# Patient Record
Sex: FEMALE | Race: WHITE | NOT HISPANIC OR LATINO | Employment: FULL TIME | URBAN - METROPOLITAN AREA
[De-identification: names, ages, dates, MRNs, and addresses within clinical notes are randomized per-mention and may not be internally consistent; named-entity substitution may affect disease eponyms.]

---

## 2018-07-30 ENCOUNTER — HOSPITAL ENCOUNTER (EMERGENCY)
Facility: HOSPITAL | Age: 18
Discharge: HOME | End: 2018-07-30
Attending: FAMILY MEDICINE
Payer: MEDICAID

## 2018-07-30 VITALS
HEIGHT: 64 IN | BODY MASS INDEX: 27.31 KG/M2 | WEIGHT: 160 LBS | OXYGEN SATURATION: 99 % | RESPIRATION RATE: 20 BRPM | TEMPERATURE: 98 F | SYSTOLIC BLOOD PRESSURE: 127 MMHG | HEART RATE: 99 BPM | DIASTOLIC BLOOD PRESSURE: 91 MMHG

## 2018-07-30 DIAGNOSIS — M54.40 BACK PAIN OF LUMBAR REGION WITH SCIATICA: Primary | ICD-10-CM

## 2018-07-30 PROCEDURE — 99283 EMERGENCY DEPT VISIT LOW MDM: CPT

## 2018-07-30 PROCEDURE — 25000003 PHARM REV CODE 250: Performed by: NURSE PRACTITIONER

## 2018-07-30 RX ORDER — CYCLOBENZAPRINE HCL 5 MG
10 TABLET ORAL
Status: COMPLETED | OUTPATIENT
Start: 2018-07-30 | End: 2018-07-30

## 2018-07-30 RX ORDER — CYCLOBENZAPRINE HCL 10 MG
5 TABLET ORAL 3 TIMES DAILY PRN
Qty: 15 TABLET | Refills: 0 | Status: SHIPPED | OUTPATIENT
Start: 2018-07-30 | End: 2018-08-04

## 2018-07-30 RX ORDER — NAPROXEN 500 MG/1
500 TABLET ORAL 2 TIMES DAILY PRN
Qty: 30 TABLET | Refills: 0 | Status: SHIPPED | OUTPATIENT
Start: 2018-07-30 | End: 2018-08-09

## 2018-07-30 RX ORDER — KETOROLAC TROMETHAMINE 10 MG/1
10 TABLET, FILM COATED ORAL
Status: COMPLETED | OUTPATIENT
Start: 2018-07-30 | End: 2018-07-30

## 2018-07-30 RX ADMIN — KETOROLAC TROMETHAMINE 10 MG: 10 TABLET, FILM COATED ORAL at 11:07

## 2018-07-30 RX ADMIN — CYCLOBENZAPRINE HYDROCHLORIDE 10 MG: 5 TABLET, FILM COATED ORAL at 11:07

## 2018-07-30 NOTE — ED PROVIDER NOTES
Encounter Date: 7/30/2018       History     Chief Complaint   Patient presents with    left leg pain, lower back     Patient with chronic low back pain.  Denies recent injury  Increase pain x 1 week  Pain radiating down left leg  Pain 10/10. Patient made no attempt to take OTC medications.      The history is provided by the patient.   Back Pain    This is a recurrent problem. The current episode started several days ago. The problem occurs constantly. The problem has been gradually worsening. The pain is associated with no known injury. The pain is present in the lumbar spine. The quality of the pain is described as shooting. The pain radiates to the left leg and left thigh. The pain is at a severity of 10/10. The symptoms are aggravated by bending, twisting and certain positions. Pertinent negatives include no chest pain, no fever, no numbness, no weight loss, no headaches, no abdominal pain, no abdominal swelling, no bowel incontinence, no perianal numbness, no bladder incontinence, no dysuria, no pelvic pain, no leg pain, no paresthesias, no paresis, no tingling and no weakness. She has tried nothing for the symptoms. The treatment provided no relief.     Review of patient's allergies indicates:  No Known Allergies  History reviewed. No pertinent past medical history.  History reviewed. No pertinent surgical history.  History reviewed. No pertinent family history.  Social History   Substance Use Topics    Smoking status: Current Some Day Smoker    Smokeless tobacco: Not on file    Alcohol use Yes      Comment: occ     Review of Systems   Constitutional: Negative for fever and weight loss.   HENT: Negative for sore throat.    Respiratory: Negative for shortness of breath.    Cardiovascular: Negative for chest pain.   Gastrointestinal: Negative for abdominal pain, bowel incontinence and nausea.   Genitourinary: Negative for bladder incontinence, dysuria and pelvic pain.   Musculoskeletal: Positive for back  pain.   Skin: Negative for rash.   Neurological: Negative for tingling, weakness, numbness, headaches and paresthesias.   Hematological: Does not bruise/bleed easily.   All other systems reviewed and are negative.      Physical Exam     Initial Vitals [07/30/18 1040]   BP Pulse Resp Temp SpO2   (!) 127/91 99 20 98.3 °F (36.8 °C) 99 %      MAP       --         Physical Exam    Nursing note and vitals reviewed.  Constitutional: She appears well-developed and well-nourished.   HENT:   Head: Normocephalic.   Eyes: Pupils are equal, round, and reactive to light.   Neck: Normal range of motion.   Cardiovascular: Normal rate, regular rhythm and normal heart sounds.   Pulmonary/Chest: Breath sounds normal.   Musculoskeletal: Normal range of motion. She exhibits tenderness.        Lumbar back: She exhibits tenderness. She exhibits no bony tenderness.        Back:    Neurological: She is alert and oriented to person, place, and time.   Skin: Skin is warm and dry.   Psychiatric: She has a normal mood and affect. Her behavior is normal. Judgment and thought content normal.         ED Course   Procedures  Labs Reviewed - No data to display       Imaging Results    None          Medical Decision Making:   Initial Assessment:   Chronic low back pain with sciatic  ED Management:  Meds admin.    Discussed physical exam findings with patient  No acute emergent medication condition identified at this time to warrant further testing/diagnostics.  Plan to discharge patient home with instructions to follow-up with PCP in 2-5 days or return to ED if symptoms  Worsen.  Patient verbalized agreement to discharge treatment plan.                        Clinical Impression:   The encounter diagnosis was Back pain of lumbar region with sciatica.                             Mariana Denis NP  07/30/18 5354

## 2021-02-23 PROCEDURE — 99284 EMERGENCY DEPT VISIT MOD MDM: CPT

## 2021-02-24 ENCOUNTER — HOSPITAL ENCOUNTER (EMERGENCY)
Facility: HOSPITAL | Age: 21
Discharge: HOME | End: 2021-02-24
Attending: EMERGENCY MEDICINE
Payer: MEDICAID

## 2021-02-24 VITALS
RESPIRATION RATE: 17 BRPM | BODY MASS INDEX: 40.63 KG/M2 | OXYGEN SATURATION: 100 % | TEMPERATURE: 98 F | SYSTOLIC BLOOD PRESSURE: 119 MMHG | WEIGHT: 238 LBS | HEART RATE: 84 BPM | DIASTOLIC BLOOD PRESSURE: 74 MMHG | HEIGHT: 64 IN

## 2021-02-24 DIAGNOSIS — I10 HYPERTENSION, UNSPECIFIED TYPE: Primary | ICD-10-CM

## 2021-02-24 DIAGNOSIS — N39.0 URINARY TRACT INFECTION WITHOUT HEMATURIA, SITE UNSPECIFIED: ICD-10-CM

## 2021-02-24 LAB
ALBUMIN SERPL BCP-MCNC: 2.9 G/DL (ref 3.5–5.2)
ALP SERPL-CCNC: 72 U/L (ref 55–135)
ALT SERPL W/O P-5'-P-CCNC: 18 U/L (ref 10–44)
ANION GAP SERPL CALC-SCNC: 13 MMOL/L (ref 8–16)
AST SERPL-CCNC: 14 U/L (ref 10–40)
BACTERIA #/AREA URNS HPF: ABNORMAL /HPF
BASOPHILS # BLD AUTO: 0.05 K/UL (ref 0–0.2)
BASOPHILS NFR BLD: 0.3 % (ref 0–1.9)
BILIRUB SERPL-MCNC: 0.6 MG/DL (ref 0.1–1)
BILIRUB UR QL STRIP: NEGATIVE
BUN SERPL-MCNC: 18 MG/DL (ref 6–20)
CALCIUM SERPL-MCNC: 8.4 MG/DL (ref 8.7–10.5)
CHLORIDE SERPL-SCNC: 100 MMOL/L (ref 95–110)
CLARITY UR: CLEAR
CO2 SERPL-SCNC: 24 MMOL/L (ref 23–29)
COLOR UR: YELLOW
CREAT SERPL-MCNC: 0.9 MG/DL (ref 0.5–1.4)
DIFFERENTIAL METHOD: ABNORMAL
EOSINOPHIL # BLD AUTO: 0.2 K/UL (ref 0–0.5)
EOSINOPHIL NFR BLD: 1.4 % (ref 0–8)
ERYTHROCYTE [DISTWIDTH] IN BLOOD BY AUTOMATED COUNT: 16.5 % (ref 11.5–14.5)
EST. GFR  (AFRICAN AMERICAN): >60 ML/MIN/1.73 M^2
EST. GFR  (NON AFRICAN AMERICAN): >60 ML/MIN/1.73 M^2
GLUCOSE SERPL-MCNC: 99 MG/DL (ref 70–110)
GLUCOSE UR QL STRIP: NEGATIVE
HCT VFR BLD AUTO: 34.3 % (ref 37–48.5)
HGB BLD-MCNC: 10.6 G/DL (ref 12–16)
HGB UR QL STRIP: ABNORMAL
HYALINE CASTS #/AREA URNS LPF: 12 /LPF
IMM GRANULOCYTES # BLD AUTO: 0.32 K/UL (ref 0–0.04)
IMM GRANULOCYTES NFR BLD AUTO: 2.1 % (ref 0–0.5)
KETONES UR QL STRIP: NEGATIVE
LEUKOCYTE ESTERASE UR QL STRIP: ABNORMAL
LYMPHOCYTES # BLD AUTO: 2.9 K/UL (ref 1–4.8)
LYMPHOCYTES NFR BLD: 18.6 % (ref 18–48)
MCH RBC QN AUTO: 27.7 PG (ref 27–31)
MCHC RBC AUTO-ENTMCNC: 30.9 G/DL (ref 32–36)
MCV RBC AUTO: 90 FL (ref 82–98)
MICROSCOPIC COMMENT: ABNORMAL
MONOCYTES # BLD AUTO: 0.8 K/UL (ref 0.3–1)
MONOCYTES NFR BLD: 5.3 % (ref 4–15)
NEUTROPHILS # BLD AUTO: 11.1 K/UL (ref 1.8–7.7)
NEUTROPHILS NFR BLD: 72.3 % (ref 38–73)
NITRITE UR QL STRIP: NEGATIVE
NRBC BLD-RTO: 0 /100 WBC
PH UR STRIP: 7 [PH] (ref 5–8)
PLATELET # BLD AUTO: 380 K/UL (ref 150–350)
PMV BLD AUTO: 10.5 FL (ref 9.2–12.9)
POTASSIUM SERPL-SCNC: 4 MMOL/L (ref 3.5–5.1)
PROT SERPL-MCNC: 7.2 G/DL (ref 6–8.4)
PROT UR QL STRIP: ABNORMAL
RBC # BLD AUTO: 3.83 M/UL (ref 4–5.4)
RBC #/AREA URNS HPF: 64 /HPF (ref 0–4)
SODIUM SERPL-SCNC: 137 MMOL/L (ref 136–145)
SP GR UR STRIP: 1.01 (ref 1–1.03)
SQUAMOUS #/AREA URNS HPF: 1 /HPF
URN SPEC COLLECT METH UR: ABNORMAL
UROBILINOGEN UR STRIP-ACNC: NEGATIVE EU/DL
WBC # BLD AUTO: 15.41 K/UL (ref 3.9–12.7)
WBC #/AREA URNS HPF: 68 /HPF (ref 0–5)

## 2021-02-24 PROCEDURE — 36415 COLL VENOUS BLD VENIPUNCTURE: CPT

## 2021-02-24 PROCEDURE — 85025 COMPLETE CBC W/AUTO DIFF WBC: CPT

## 2021-02-24 PROCEDURE — 96365 THER/PROPH/DIAG IV INF INIT: CPT

## 2021-02-24 PROCEDURE — 63600175 PHARM REV CODE 636 W HCPCS: Performed by: EMERGENCY MEDICINE

## 2021-02-24 PROCEDURE — 80053 COMPREHEN METABOLIC PANEL: CPT

## 2021-02-24 PROCEDURE — 96375 TX/PRO/DX INJ NEW DRUG ADDON: CPT

## 2021-02-24 PROCEDURE — 87086 URINE CULTURE/COLONY COUNT: CPT

## 2021-02-24 PROCEDURE — 81001 URINALYSIS AUTO W/SCOPE: CPT

## 2021-02-24 RX ORDER — HYDRALAZINE HYDROCHLORIDE 20 MG/ML
5 INJECTION INTRAMUSCULAR; INTRAVENOUS
Status: COMPLETED | OUTPATIENT
Start: 2021-02-24 | End: 2021-02-24

## 2021-02-24 RX ORDER — NIFEDIPINE 30 MG/1
30 TABLET, EXTENDED RELEASE ORAL DAILY
Qty: 30 TABLET | Refills: 11 | Status: SHIPPED | OUTPATIENT
Start: 2021-02-24 | End: 2022-02-24

## 2021-02-24 RX ORDER — ENOXAPARIN SODIUM 150 MG/ML
130 INJECTION SUBCUTANEOUS DAILY
COMMUNITY

## 2021-02-24 RX ORDER — CEPHALEXIN 500 MG/1
500 CAPSULE ORAL 4 TIMES DAILY
Qty: 40 CAPSULE | Refills: 0 | Status: SHIPPED | OUTPATIENT
Start: 2021-02-24 | End: 2021-03-06

## 2021-02-24 RX ADMIN — HYDRALAZINE HYDROCHLORIDE 5 MG: 20 INJECTION, SOLUTION INTRAMUSCULAR; INTRAVENOUS at 12:02

## 2021-02-24 RX ADMIN — CEFTRIAXONE 1 G: 1 INJECTION, SOLUTION INTRAVENOUS at 01:02

## 2021-02-25 LAB
BACTERIA UR CULT: NORMAL
BACTERIA UR CULT: NORMAL

## 2022-08-23 PROCEDURE — 99283 EMERGENCY DEPT VISIT LOW MDM: CPT

## 2022-08-24 ENCOUNTER — HOSPITAL ENCOUNTER (EMERGENCY)
Facility: HOSPITAL | Age: 22
Discharge: HOME | End: 2022-08-24
Attending: EMERGENCY MEDICINE
Payer: MEDICAID

## 2022-08-24 VITALS
RESPIRATION RATE: 18 BRPM | BODY MASS INDEX: 36.7 KG/M2 | WEIGHT: 215 LBS | HEART RATE: 94 BPM | TEMPERATURE: 99 F | SYSTOLIC BLOOD PRESSURE: 133 MMHG | HEIGHT: 64 IN | DIASTOLIC BLOOD PRESSURE: 106 MMHG | OXYGEN SATURATION: 98 %

## 2022-08-24 DIAGNOSIS — S92.501A CLOSED FRACTURE OF PHALANX OF RIGHT FIFTH TOE, INITIAL ENCOUNTER: Primary | ICD-10-CM

## 2022-08-24 LAB
B-HCG UR QL: NEGATIVE
CTP QC/QA: YES

## 2022-08-24 PROCEDURE — 81025 URINE PREGNANCY TEST: CPT | Performed by: NURSE PRACTITIONER

## 2022-08-24 PROCEDURE — 25000003 PHARM REV CODE 250: Performed by: EMERGENCY MEDICINE

## 2022-08-24 RX ORDER — NAPROXEN 500 MG/1
500 TABLET ORAL 2 TIMES DAILY WITH MEALS
Qty: 30 TABLET | Refills: 0 | Status: SHIPPED | OUTPATIENT
Start: 2022-08-24

## 2022-08-24 RX ORDER — OXYCODONE AND ACETAMINOPHEN 5; 325 MG/1; MG/1
1 TABLET ORAL
Status: COMPLETED | OUTPATIENT
Start: 2022-08-24 | End: 2022-08-24

## 2022-08-24 RX ADMIN — OXYCODONE AND ACETAMINOPHEN 1 TABLET: 325; 5 TABLET ORAL at 01:08

## 2022-08-24 NOTE — DISCHARGE INSTRUCTIONS
Use hard-soled shoe.  Tylenol or i Naprosyn for pain.  Return to emergency department for worsening symptoms or any problems.  Follow-up with orthopedic surgeon or podiatrist.

## 2022-08-24 NOTE — ED PROVIDER NOTES
Encounter Date: 8/23/2022       History     Chief Complaint   Patient presents with    Toe Injury     Injury to Rt 5th toe     21-year-old female presented emergency department with right foot 5th toe pain.  Patient stubbed her toe as she stepped wrong and bent the toe while she stepped wrong and fell.  Patient denies any other injuries.  Denies any other complaints.        Review of patient's allergies indicates:  No Known Allergies  Past Medical History:   Diagnosis Date    Acute thromboembolism of deep veins of lower extremity 7/21/2020 2:59:05 PM    [facility] Historical - Cardiovascular: Deep Vein Thrombosis-No Additional Notes    Acute thromboembolism of deep veins of lower extremity 7/21/2020 2:59:05 PM    [facility] Historical - Cardiovascular: Deep Vein Thrombosis-No Additional Notes    DVT (deep venous thrombosis)      Past Surgical History:   Procedure Laterality Date    TONSILLECTOMY       No family history on file.  Social History     Tobacco Use    Smoking status: Current Some Day Smoker     Types: Cigarettes, Vaping with nicotine    Smokeless tobacco: Never Used   Substance Use Topics    Alcohol use: Yes     Comment: occ    Drug use: No     Review of Systems   Constitutional: Negative.    HENT: Negative.    Eyes: Negative.    Respiratory: Negative.    Cardiovascular: Negative.  Negative for chest pain.   Gastrointestinal: Negative.    Endocrine: Negative.    Genitourinary: Negative.    Musculoskeletal: Negative.         Right foot 5th toe pain and swelling   Skin: Negative.    Allergic/Immunologic: Negative.    Neurological: Negative.    Hematological: Negative.    Psychiatric/Behavioral: Negative.    All other systems reviewed and are negative.      Physical Exam     Initial Vitals [08/24/22 0018]   BP Pulse Resp Temp SpO2   (!) 133/106 94 16 98.5 °F (36.9 °C) 98 %      MAP       --         Physical Exam    Nursing note and vitals reviewed.  Constitutional: She appears well-developed and  well-nourished.   HENT:   Head: Normocephalic and atraumatic.   Nose: Nose normal.   Mouth/Throat: Oropharynx is clear and moist.   Eyes: Conjunctivae and EOM are normal. Pupils are equal, round, and reactive to light.   Neck: Neck supple. No thyromegaly present. No tracheal deviation present. No JVD present.   Normal range of motion.  Cardiovascular: Normal rate, regular rhythm, normal heart sounds and intact distal pulses.   No murmur heard.  Pulmonary/Chest: Breath sounds normal. No stridor. No respiratory distress. She has no wheezes. She has no rales.   Abdominal: Abdomen is soft. Bowel sounds are normal.   Musculoskeletal:         General: Tenderness present. No edema. Normal range of motion.      Cervical back: Normal range of motion and neck supple.      Comments: Right foot 5th toe pain and swelling and tenderness and extremities neurovascularly intact     Neurological: She is alert and oriented to person, place, and time. She has normal strength.   Skin: Skin is warm. Capillary refill takes less than 2 seconds.   Psychiatric: She has a normal mood and affect. Thought content normal.         ED Course   Procedures  Labs Reviewed   POCT URINE PREGNANCY          Imaging Results          X-Ray Toe 2 or More Views Right (In process)  Result time 08/24/22 00:52:41              X-Rays:   Independently Interpreted Readings:   Other Readings:  Right foot 5th toe proximal phalanx fracture    Medications   oxyCODONE-acetaminophen 5-325 mg per tablet 1 tablet (has no administration in time range)   ibuprofen tablet 600 mg (has no administration in time range)     Medical Decision Making:   Differential Diagnosis:   Patient with right foot 5th toe fracture.  Hard soled shoe placed.  Pain control.  Discharged with instructions and follow-up with orthopedics and podiatrist.  Clinical Tests:   Radiological Study: Reviewed                      Clinical Impression:   Final diagnoses:  [S92.501A] Closed fracture of phalanx  of right fifth toe, initial encounter (Primary)          ED Disposition Condition    Discharge Stable        ED Prescriptions     Medication Sig Dispense Start Date End Date Auth. Provider    naproxen (NAPROSYN) 500 MG tablet Take 1 tablet (500 mg total) by mouth 2 (two) times daily with meals. 30 tablet 8/24/2022  Tevin Mason MD        Follow-up Information     Follow up With Specialties Details Why Contact [facility]  In 2 days  [address]  [place] [address]  [phone]      Juan Noriega DPM Podiatry, Surgery In 2 days  [address]  [address]  [facility] [address]  [phone]             Tevin Mason MD  08/24/22 0117

## 2023-04-06 ENCOUNTER — HOSPITAL ENCOUNTER (EMERGENCY)
Facility: HOSPITAL | Age: 23
Discharge: HOME | End: 2023-04-06
Attending: EMERGENCY MEDICINE
Payer: MEDICAID

## 2023-04-06 VITALS
RESPIRATION RATE: 18 BRPM | SYSTOLIC BLOOD PRESSURE: 139 MMHG | OXYGEN SATURATION: 98 % | DIASTOLIC BLOOD PRESSURE: 93 MMHG | HEART RATE: 98 BPM | BODY MASS INDEX: 36.9 KG/M2 | WEIGHT: 215 LBS | TEMPERATURE: 98 F

## 2023-04-06 DIAGNOSIS — S69.91XA HAND INJURY, RIGHT, INITIAL ENCOUNTER: Primary | ICD-10-CM

## 2023-04-06 DIAGNOSIS — M79.641 RIGHT HAND PAIN: ICD-10-CM

## 2023-04-06 LAB
B-HCG UR QL: NEGATIVE
CTP QC/QA: YES

## 2023-04-06 PROCEDURE — 29125 APPL SHORT ARM SPLINT STATIC: CPT | Mod: RT

## 2023-04-06 PROCEDURE — 99283 EMERGENCY DEPT VISIT LOW MDM: CPT

## 2023-04-06 PROCEDURE — 81025 URINE PREGNANCY TEST: CPT

## 2023-04-06 PROCEDURE — 25000003 PHARM REV CODE 250

## 2023-04-06 RX ORDER — ACETAMINOPHEN 500 MG
1000 TABLET ORAL
Status: COMPLETED | OUTPATIENT
Start: 2023-04-06 | End: 2023-04-06

## 2023-04-06 RX ADMIN — ACETAMINOPHEN 1000 MG: 500 TABLET ORAL at 09:04

## 2023-04-07 NOTE — ED PROVIDER NOTES
Encounter Date: 4/6/2023       History     Chief Complaint   Patient presents with    Hand Injury     Punched window with right hand, now having pain and swelling     Patient is a 22 y.o. female with past medical history of DVT who presents to ED via self for concern for right hand pian which began 2 hours PTA. Patient states she punched her car window. Patient is complaining of pain on her right pinky at the base.  Patient states she was mad at her mom when she punched the window.  Patient denies any other injuries.  Patient denies glass breakage.  Patient is awake and alert in no acute distress.  Patient denies any chest pain or shortness of breath.       Review of patient's allergies indicates:  No Known Allergies  Past Medical History:   Diagnosis Date    Acute thromboembolism of deep veins of lower extremity 7/21/2020 2:59:05 PM    [facility] Historical - Cardiovascular: Deep Vein Thrombosis-No Additional Notes    Acute thromboembolism of deep veins of lower extremity 7/21/2020 2:59:05 PM    [facility] Historical - Cardiovascular: Deep Vein Thrombosis-No Additional Notes    DVT (deep venous thrombosis)      Past Surgical History:   Procedure Laterality Date    TONSILLECTOMY       No family history on file.  Social History     Tobacco Use    Smoking status: Some Days     Types: Cigarettes, Vaping with nicotine    Smokeless tobacco: Never   Substance Use Topics    Alcohol use: Yes     Comment: occ    Drug use: No     Review of Systems   Constitutional:  Negative for fever.   HENT: Negative.  Negative for congestion and sore throat.    Eyes: Negative.    Respiratory:  Negative for cough and shortness of breath.    Cardiovascular:  Negative for chest pain.   Gastrointestinal:  Negative for abdominal pain, nausea and vomiting.   Genitourinary: Negative.    Musculoskeletal:  Negative for back pain.        Right hand pain   Skin:  Negative for color change, pallor and rash.   Neurological:  Negative for dizziness,  weakness and headaches.   Hematological:  Does not bruise/bleed easily.   Psychiatric/Behavioral: Negative.       Physical Exam     Initial Vitals [04/06/23 1916]   BP Pulse Resp Temp SpO2   (!) 141/102 107 18 98.3 °F (36.8 °C) 99 %      MAP       --         Physical Exam    Nursing note and vitals reviewed.  Constitutional: She appears well-developed and well-nourished. She is not diaphoretic. No distress.   HENT:   Head: Normocephalic and atraumatic.   Right Ear: External ear normal.   Left Ear: External ear normal.   Nose: Nose normal.   Eyes: Conjunctivae and EOM are normal.   Neck:   Normal range of motion.  Cardiovascular:  Normal rate, regular rhythm, normal heart sounds and intact distal pulses.     Exam reveals no gallop and no friction rub.       No murmur heard.  Pulmonary/Chest: Breath sounds normal. No respiratory distress. She has no wheezes. She has no rhonchi. She has no rales. She exhibits no tenderness.   Musculoskeletal:      Right wrist: No swelling, deformity, tenderness, bony tenderness, snuff box tenderness or crepitus. Normal range of motion. Normal pulse.      Right hand: Bony tenderness present. Decreased range of motion. Normal strength. Normal sensation. Normal capillary refill.        Hands:       Cervical back: Normal range of motion.     Neurological: She is alert and oriented to person, place, and time. She has normal strength. GCS score is 15. GCS eye subscore is 4. GCS verbal subscore is 5. GCS motor subscore is 6.   Skin: Skin is warm and dry. Capillary refill takes less than 2 seconds.   Psychiatric: She has a normal mood and affect. Her behavior is normal. Judgment and thought content normal.       ED Course   Orthopedic Injury    Date/Time: 4/6/2023 10:13 PM  Performed by: Shilpi Storey NP  Authorized by: Boone Cameron DO     Location procedure was performed:  [facility] EMERGENCY DEPARTMENT  Consent Done?:  Yes  Universal Protocol:     Verbal consent obtained?: Yes      Risks  and benefits: Risks, benefits and alternatives were discussed      Consent given by:  Patient    Patient states understanding of procedure being performed: Yes      Patient identity confirmed:  Verbally with patient  Injury:     Injury location:  Hand    Location details:  Right hand    Injury type:  Soft tissue      Pre-procedure assessment:     Neurovascular status: Neurovascularly intact      Distal perfusion: normal      Neurological function: normal      Range of motion: normal        Selections made in this section will also lock the Injury type section above.:     Immobilization:  Splint    Splint type:  Ulnar gutter    Supplies used:  Cotton padding, Ortho-Glass and elastic bandage  Post-procedure assessment:     Neurovascular status: Neurovascularly intact      Distal perfusion: normal      Neurological function: normal      Range of motion: splinted      Patient tolerance:  Patient tolerated the procedure well with no immediate complications  Labs Reviewed   POCT URINE PREGNANCY          Imaging Results              X-Ray Hand 3 view Right (Preliminary result)  Result time 04/06/23 20:13:35      Wet Read by Boone Cameron DO (04/06/23 20:13:35, [facility] - Emergency Dept, Emergency Medicine)    No acute fracture                                     Medications   acetaminophen tablet 1,000 mg (1,000 mg Oral Given 4/6/23 2155)     Medical Decision Making:   Initial Assessment:   Patient is a 22 y.o. female with past medical history of DVT who presents to ED via self for concern for right hand pian which began 2 hours PTA. Patient states she punched her car window. Patient is complaining of pain on her right pinky at the base.  Patient states she was mad at her mom when she punched the window.  Patient denies any other injuries.  Patient denies glass breakage.  Patient is awake and alert in no acute distress.  Patient denies any chest pain or shortness of breath.       Differential Diagnosis:    Differential diagnosis include boxer's fracture, metacarpal fracture, finger dislocation, finger fracture  ED Management:  MDM    Patient presents for emergent evaluation of acute right hand pain that poses a possible threat to life and/or bodily function.    In the ED patient found to have acute right hand pain at base of right pinky with palpation.  There is minimal edema with a small bruise..    I ordered labs and personally reviewed them.  Labs significant for negative UPT.    I ordered X-rays and personally reviewed them and reviewed them with my attending Dr. Cameron.  Radiologist has not read films at time of discharge.  Xray significant for no acute fracture.  Due to patient's pain with palpation will place on splint as preventative and have her follow up with Orthopedics.      Discharge [facility]  I discussed the patient presentation labs, X-rays findings with my attending Dr. Cameron.    Patient was managed in the ED with oral Tylenol and splint placement.    The response to treatment was good.    Patient was discharged in stable condition with close follow up with Orthopedics.  Detailed return precautions discussed.                         Clinical Impression:   Final diagnoses:  [M79.641] Right hand pain  [S69.91XA] Hand injury, right, initial encounter (Primary)        ED Disposition Condition    Discharge Stable          ED Prescriptions    None       Follow-up Information       Follow up With Specialties Details Why Contact Info Additional Information    Edvin Marshall MD Sports Medicine, Orthopedic Surgery Schedule an appointment as soon as possible for a visit  For recheck/continuing care [address] [place] [address] [address]  [facility] [address]  [phone]       [facility] - Emergency Dept Emergency Medicine  If symptoms worsen [address] [address] [phone] 1st floor             Shilpi Storey NP  04/06/23 8996

## 2023-04-07 NOTE — DISCHARGE INSTRUCTIONS
Please follow up with Orthopedics and wear the splint until you see them.  Elevate extremity at home to help decrease swelling.  You can use ice to help decrease pain.  Please return to the ED for worsening pain, numbness and tingling in her fingers, change in color in her fingers, fever, or any new or worsening concerns.